# Patient Record
Sex: MALE | Race: WHITE | NOT HISPANIC OR LATINO | Employment: FULL TIME | URBAN - METROPOLITAN AREA
[De-identification: names, ages, dates, MRNs, and addresses within clinical notes are randomized per-mention and may not be internally consistent; named-entity substitution may affect disease eponyms.]

---

## 2019-11-18 NOTE — TELEPHONE ENCOUNTER
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 Moreno   DISCHARGE SUMMARY from hospital N/A    DISCHARGE REPORT from the ER N/A    OPERATIVE REPORT Care Everywhere 12/7/18   MEDICATION LIST Care Everywhere    IMPLANT RECORD/STICKER N/A    LABS     CBC/DIFF N/A    CULTURES N/A    INJECTIONS DONE IN RADIOLOGY N/A    MRI N/A    CT SCAN N/A    XRAYS (IMAGES & REPORTS) Received  [facility] 2018   TUMOR     PATHOLOGY  Slides & report N/A      11/18/19 SE  5:13 PM  Faxed request to [facility] for images

## 2019-12-12 NOTE — PROGRESS NOTES
Date of Service: 12/13/2019    Chief Complaint: No chief complaint on file.       HPI: Maxim is a 39 year old male who presents today for further evaluation of BL foot pain.  Nature: sharp    Location: hard to localize.     Duration: 1 year    Onset: gradual. No inciting trauma    Course: worsening    Aggravating/alleviating factors: walking and weight bearing aggravate. Rest alleviates.     Previous Treatments: Had BL neurectomies about a year ago. Pain never fully went away after the surgery. Has tried orthotics and no relief. Relates that he cannot stand for long periods. Worse barefoot.        Review of Systems: Answers for HPI/ROS submitted by the patient on 12/13/2019   General Symptoms: No  Skin Symptoms: No  HENT Symptoms: No  EYE SYMPTOMS: No  HEART SYMPTOMS: No  LUNG SYMPTOMS: No  INTESTINAL SYMPTOMS: Yes  URINARY SYMPTOMS: No  REPRODUCTIVE SYMPTOMS: No  SKELETAL SYMPTOMS: No  BLOOD SYMPTOMS: No  NERVOUS SYSTEM SYMPTOMS: No  MENTAL HEALTH SYMPTOMS: No  Heart burn or indigestion: Yes  Nausea: No  Vomiting: No  Abdominal pain: No  Bloating: No  Constipation: No  Diarrhea: No  Blood in stool: No  Black stools: No  Rectal or Anal pain: No  Fecal incontinence: No  Yellowing of skin or eyes: No  Vomit with blood: No  Change in stools: No      PMH: No past medical history on file.    PSxH: BL neurectomies.     Allergies: Patient has no allergy information on record.    SH:   Social History     Socioeconomic History     Marital status:      Spouse name: Not on file     Number of children: Not on file     Years of education: Not on file     Highest education level: Not on file   Occupational History     Not on file   Social Needs     Financial resource strain: Not on file     Food insecurity:     Worry: Not on file     Inability: Not on file     Transportation needs:     Medical: Not on file     Non-medical: Not on file   Tobacco Use     Smoking status: Not on file   Substance and Sexual Activity      Alcohol use: Not on file     Drug use: Not on file     Sexual activity: Not on file   Lifestyle     Physical activity:     Days per week: Not on file     Minutes per session: Not on file     Stress: Not on file   Relationships     Social connections:     Talks on phone: Not on file     Gets together: Not on file     Attends Christianity service: Not on file     Active member of club or organization: Not on file     Attends meetings of clubs or organizations: Not on file     Relationship status: Not on file     Intimate partner violence:     Fear of current or ex partner: Not on file     Emotionally abused: Not on file     Physically abused: Not on file     Forced sexual activity: Not on file   Other Topics Concern     Not on file   Social History Narrative     Not on file       FH: No family history on file.    Objective:      PT and DP pulses are 2/4 bilaterally. CRT is instant. Positive pedal hair.   Gross sensation is intact bilaterally. Some paresthesias noted to the BL 2nd and 3rd digits.   Equinus is noted bilaterally. Pain noted with palpation of the 2nd and 3rd interspaces BL. Pain with direct pressure on the interspaces. No pain with corresponding MTPJ ROM.   Nails normal bilaterally. No open lesions are noted.     bilateral foot xrays indicated in 6 weightbearing views.    No fractures. Close approximation of the 2nd and 3rd met heads. No soft tissue abnormality.      Assessment: Pain in the BL 2nd and 3rd interspaces. Ddx: stump neuroma, scar tissue.     Plan:  - Pt seen and evaluated.  - XRs taken and discussed with him.  - Recommend an MRI to assess the soft tissues.  - See again s/p MRI.

## 2019-12-13 ENCOUNTER — OFFICE VISIT (OUTPATIENT)
Dept: ORTHOPEDICS | Facility: CLINIC | Age: 39
End: 2019-12-13
Payer: COMMERCIAL

## 2019-12-13 ENCOUNTER — ANCILLARY PROCEDURE (OUTPATIENT)
Dept: GENERAL RADIOLOGY | Facility: CLINIC | Age: 39
End: 2019-12-13
Attending: PODIATRIST
Payer: COMMERCIAL

## 2019-12-13 ENCOUNTER — PRE VISIT (OUTPATIENT)
Dept: ORTHOPEDICS | Facility: CLINIC | Age: 39
End: 2019-12-13

## 2019-12-13 DIAGNOSIS — M79.672 PAIN IN BOTH FEET: ICD-10-CM

## 2019-12-13 DIAGNOSIS — M79.671 PAIN IN BOTH FEET: ICD-10-CM

## 2019-12-13 DIAGNOSIS — M79.671 PAIN IN BOTH FEET: Primary | ICD-10-CM

## 2019-12-13 DIAGNOSIS — M79.672 PAIN IN BOTH FEET: Primary | ICD-10-CM

## 2019-12-13 DIAGNOSIS — G57.62 MORTON'S NEUROMA OF LEFT FOOT: ICD-10-CM

## 2019-12-13 RX ORDER — METHOCARBAMOL 500 MG/1
500 TABLET, FILM COATED ORAL
COMMUNITY
Start: 2019-12-11 | End: 2019-12-18

## 2019-12-13 ASSESSMENT — ENCOUNTER SYMPTOMS
JAUNDICE: 0
RECTAL PAIN: 0
NAUSEA: 0
HEARTBURN: 1
DIARRHEA: 0
BLOOD IN STOOL: 0
VOMITING: 0
ABDOMINAL PAIN: 0
CONSTIPATION: 0
BLOATING: 0
BOWEL INCONTINENCE: 0

## 2019-12-13 NOTE — LETTER
RE: Maxim Torres  [address] [facility] [address]     Dear Colleague,    Thank you for referring your patient, Maxim Torres, to the [facility] at [facility]. Please see a copy of my visit note below.    Date of Service: 12/13/2019    Chief Complaint: No chief complaint on file.     HPI: Maxim is a 39 year old male who presents today for further evaluation of BL foot pain.  Nature: sharp    Location: hard to localize.     Duration: 1 year    Onset: gradual. No inciting trauma    Course: worsening    Aggravating/alleviating factors: walking and weight bearing aggravate. Rest alleviates.     Previous Treatments: Had BL neurectomies about a year ago. Pain never fully went away after the surgery. Has tried orthotics and no relief. Relates that he cannot stand for long periods. Worse barefoot.      PMH: No past medical history on file.    PSxH: BL neurectomies.     Allergies: Patient has no allergy information on record.    SH:   Social History     Socioeconomic History     Marital status:      Spouse name: Not on file     Number of children: Not on file     Years of education: Not on file     Highest education level: Not on file   Occupational History     Not on file   Social Needs     Financial resource strain: Not on file     Food insecurity:     Worry: Not on file     Inability: Not on file     Transportation needs:     Medical: Not on file     Non-medical: Not on file   Tobacco Use     Smoking status: Not on file   Substance and Sexual Activity     Alcohol use: Not on file     Drug use: Not on file     Sexual activity: Not on file   Lifestyle     Physical activity:     Days per week: Not on file     Minutes per session: Not on file     Stress: Not on file   Relationships     Social connections:     Talks on phone: Not on file     Gets together: Not on file     Attends Caodaism service: Not on file     Active member of club or  organization: Not on file     Attends meetings of clubs or organizations: Not on file     Relationship status: Not on file     Intimate partner violence:     Fear of current or ex partner: Not on file     Emotionally abused: Not on file     Physically abused: Not on file     Forced sexual activity: Not on file   Other Topics Concern     Not on file   Social History Narrative     Not on file       FH: No family history on file.    Objective:      PT and DP pulses are 2/4 bilaterally. CRT is instant. Positive pedal hair.   Gross sensation is intact bilaterally. Some paresthesias noted to the BL 2nd and 3rd digits.   Equinus is noted bilaterally. Pain noted with palpation of the 2nd and 3rd interspaces BL. Pain with direct pressure on the interspaces. No pain with corresponding MTPJ ROM.   Nails normal bilaterally. No open lesions are noted.     bilateral foot xrays indicated in 6 weightbearing views.    No fractures. Close approximation of the 2nd and 3rd met heads. No soft tissue abnormality.      Assessment: Pain in the BL 2nd and 3rd interspaces. Ddx: stump neuroma, scar tissue.     Plan:  - Pt seen and evaluated.  - XRs taken and discussed with him.  - Recommend an MRI to assess the soft tissues.  - See again s/p MRI.      Again, thank you for allowing me to participate in the care of your patient.      Sincerely,    Torito No DPM

## 2019-12-13 NOTE — NURSING NOTE
Reason For Visit:   Chief Complaint   Patient presents with     Consult     bilateral bermudez's neuroma L>R second opinion       There were no vitals taken for this visit.    Pain Assessment  Patient Currently in Pain: Yes  0-10 Pain Scale: 4  Primary Pain Location: Foot    Alondra Samson ATC

## 2019-12-23 ENCOUNTER — ANCILLARY PROCEDURE (OUTPATIENT)
Dept: MRI IMAGING | Facility: CLINIC | Age: 39
End: 2019-12-23
Attending: PODIATRIST
Payer: COMMERCIAL

## 2019-12-23 DIAGNOSIS — M79.672 PAIN IN BOTH FEET: ICD-10-CM

## 2019-12-23 DIAGNOSIS — M79.671 PAIN IN BOTH FEET: ICD-10-CM

## 2019-12-23 DIAGNOSIS — G57.62 MORTON'S NEUROMA OF LEFT FOOT: ICD-10-CM

## 2019-12-30 ENCOUNTER — OFFICE VISIT (OUTPATIENT)
Dept: ORTHOPEDICS | Facility: CLINIC | Age: 39
End: 2019-12-30
Payer: COMMERCIAL

## 2019-12-30 DIAGNOSIS — M79.672 PAIN IN BOTH FEET: Primary | ICD-10-CM

## 2019-12-30 DIAGNOSIS — M79.671 PAIN IN BOTH FEET: Primary | ICD-10-CM

## 2019-12-30 DIAGNOSIS — M77.52 BURSITIS OF LEFT FOOT: ICD-10-CM

## 2019-12-30 RX ADMIN — TESTOSTERONE CYPIONATE 1 ML: 200 INJECTION INTRAMUSCULAR at 15:13

## 2019-12-30 RX ADMIN — LIDOCAINE HYDROCHLORIDE 1 ML: 10 INJECTION, SOLUTION EPIDURAL; INFILTRATION; INTRACAUDAL; PERINEURAL at 15:13

## 2019-12-30 RX ADMIN — TRIAMCINOLONE ACETONIDE 20 MG: 40 INJECTION, SUSPENSION INTRA-ARTICULAR; INTRAMUSCULAR at 15:13

## 2019-12-30 NOTE — NURSING NOTE
Reason For Visit:   Chief Complaint   Patient presents with     RECHECK     FU after MRI        There were no vitals taken for this visit.    Pain Assessment  Patient Currently in Pain: Yes  0-10 Pain Scale: 5    Maricel Sanches, ATC

## 2019-12-30 NOTE — LETTER
2019       RE: Maxim Torres  [address] [facility] [address]     Dear Colleague,    Thank you for referring your patient, Maxim Torres, to the [facility] at [facility]. Please see a copy of my visit note below.    Small Joint Injection/Arthrocentesis  Date/Time: 2019 3:13 PM  Performed by: Torito No DPM  Authorized by: Torito No DPM   Indications:  Pain  Needle Size:  25 G  Guidance: landmark          Location comment:  Third Interspace of left foot                         Medications:  1 mL lidocaine (PF) 1 %; 20 mg triamcinolone 40 MG/ML; 1 mL dexamethasone 120 MG/30ML          Procedure discussed: discussed risks, benefits, and alternatives    Consent Given by:  Patient  Timeout: timeout called immediately prior to procedure    Prep: patient was prepped and draped in usual sterile fashion            [facility]  [address] [address]  [phone]  Dept: [phone]  ______________________________________________________________________________    Patient: Maxim Torres   : 1980   MRN: [id number]   2019    INVASIVE PROCEDURE SAFETY CHECKLIST    Date: 2019   Procedure:Left Foot corticosteroid injection into 3rd interspace  Patient Name: Maxim Torres  MRN: [id number]  YOB: 1980    Action: Complete sections as appropriate. Any discrepancy results in a HARD COPY until resolved.     PRE PROCEDURE:  Patient ID verified with 2 identifiers (name and  or MRN): Yes  Procedure and site verified with patient/designee (when able): Yes  Accurate consent documentation in medical record: Yes  H&P (or appropriate assessment) documented in medical record: Yes  H&P must be up to 20 days prior to procedure and updates within 24 hours of procedure as applicable: Yes  Relevant diagnostic and radiology test results  appropriately labeled and displayed as applicable: Yes  Procedure site(s) marked with provider initials: Yes    TIMEOUT:  Time-Out performed immediately prior to starting procedure, including verbal and active participation of all team members addressing the following:Yes  * Correct patient identify  * Confirmed that the correct side and site are marked  * An accurate procedure consent form  * Agreement on the procedure to be done  * Correct patient position  * Relevant images and results are properly labeled and appropriately displayed  * The need to administer antibiotics or fluids for irrigation purposes during the procedure as applicable   * Safety precautions based on patient history or medication use    DURING PROCEDURE: Verification of correct person, site, and procedures any time the responsibility for care of the patient is transferred to another member of the care team.       The following medications were given:         Prior to injection, verified patient identity using patient's name and date of birth.  Due to injection administration, patient instructed to remain in clinic for 15 minutes  afterwards, and to report any adverse reaction to me immediately.    Joint injection was performed.    Medication Name: Lidocaine 1%  Drug Amount Wasted:  Yes: 4 mg/ml   Vial/Syringe: Single dose vial  Expiration Date:  10/23    Joint injection was performed.    Medication Name: Kenalog 40   Drug Amount Wasted:  None.  Vial/Syringe: Single dose vial  Expiration Date:  9/21    Joint injection was performed.    Medication Name: Dexamethsone  Drug Amount Wasted:  None.  Vial/Syringe: Single dose vial  Expiration Date:  2/21      Maricel Sanches ATC    Chief Complaint:   Chief Complaint   Patient presents with     RECHECK     FU after MRI         No Known Allergies      Subjective: Maxim is a 39 year old male who presents to the clinic today for a follow up of left foot pain. He had the MRI performed.      Objective  PT and DP pulses are 2/4 bilaterally. CRT is instant. Positive pedal hair.   Gross sensation is intact bilaterally. Some paresthesias noted to the BL 2nd and 3rd digits.   Equinus is noted bilaterally. Pain noted with palpation of the 2nd and 3rd interspaces BL. Pain with direct pressure on the interspaces. No pain with corresponding MTPJ ROM.   Nails normal bilaterally. No open lesions are noted.     MRI Impression:     1. No MR evidence of interdigital neuroma on this noncontrast study.     2. Mild intermetatarsal bursitis of the first and third interspaces.     EDMOND ROSS MD (Joe)    Assessment: Left foot interspace bursitis with pain.    Plan:   - Pt seen and evaluated  - Recommend injection into the left 3rd interspace. He agrees to this.   - I discussed the risks, complications, alternatives, benefits, and answered all of his questions. Consent was signed. After skin prep, an injection consisting of 3cc's of a 1:1 mix of 1% lidocaine plain + Kenalog-40 + dexamethasone 4mg was injected into the left 3rd interspace. He tolerated this well with no complications.   - Pt to return to clinic in 1 month.         Again, thank you for allowing me to participate in the care of your patient.      Sincerely,    Torito No DPM

## 2019-12-30 NOTE — PROGRESS NOTES
Small Joint Injection/Arthrocentesis  Date/Time: 2019 3:13 PM  Performed by: Torito No DPM  Authorized by: Torito No DPM   Indications:  Pain  Needle Size:  25 G  Guidance: landmark          Location comment:  Third Interspace of left foot                         Medications:  1 mL lidocaine (PF) 1 %; 20 mg triamcinolone 40 MG/ML; 1 mL dexamethasone 120 MG/30ML          Procedure discussed: discussed risks, benefits, and alternatives    Consent Given by:  Patient  Timeout: timeout called immediately prior to procedure    Prep: patient was prepped and draped in usual sterile fashion            [facility]  [address] [facility] [address]  [phone]  Dept: [phone]  ______________________________________________________________________________    Patient: Maxim Torres   : 1980   MRN: [id number]   2019    INVASIVE PROCEDURE SAFETY CHECKLIST    Date: 2019   Procedure:Left Foot corticosteroid injection into 3rd interspace  Patient Name: Maxim Torres  MRN: [id number]  YOB: 1980    Action: Complete sections as appropriate. Any discrepancy results in a HARD COPY until resolved.     PRE PROCEDURE:  Patient ID verified with 2 identifiers (name and  or MRN): Yes  Procedure and site verified with patient/designee (when able): Yes  Accurate consent documentation in medical record: Yes  H&P (or appropriate assessment) documented in medical record: Yes  H&P must be up to 20 days prior to procedure and updates within 24 hours of procedure as applicable: Yes  Relevant diagnostic and radiology test results appropriately labeled and displayed as applicable: Yes  Procedure site(s) marked with provider initials: Yes    TIMEOUT:  Time-Out performed immediately prior to starting procedure, including verbal and active participation of all team members addressing the following:Yes  * Correct patient  identify  * Confirmed that the correct side and site are marked  * An accurate procedure consent form  * Agreement on the procedure to be done  * Correct patient position  * Relevant images and results are properly labeled and appropriately displayed  * The need to administer antibiotics or fluids for irrigation purposes during the procedure as applicable   * Safety precautions based on patient history or medication use    DURING PROCEDURE: Verification of correct person, site, and procedures any time the responsibility for care of the patient is transferred to another member of the care team.       The following medications were given:         Prior to injection, verified patient identity using patient's name and date of birth.  Due to injection administration, patient instructed to remain in clinic for 15 minutes  afterwards, and to report any adverse reaction to me immediately.    Joint injection was performed.    Medication Name: Lidocaine 1%  Drug Amount Wasted:  Yes: 4 mg/ml   Vial/Syringe: Single dose vial  Expiration Date:  10/23    Joint injection was performed.    Medication Name: Kenalog 40   Drug Amount Wasted:  None.  Vial/Syringe: Single dose vial  Expiration Date:  9/21    Joint injection was performed.    Medication Name: Dexamethsone  Drug Amount Wasted:  None.  Vial/Syringe: Single dose vial  Expiration Date:  2/21      Maricel Sanches, ATC    Chief Complaint:   Chief Complaint   Patient presents with     RECHECK     FU after MRI         No Known Allergies      Subjective: Maxim is a 39 year old male who presents to the clinic today for a follow up of left foot pain. He had the MRI performed.     Objective  PT and DP pulses are 2/4 bilaterally. CRT is instant. Positive pedal hair.   Gross sensation is intact bilaterally. Some paresthesias noted to the BL 2nd and 3rd digits.   Equinus is noted bilaterally. Pain noted with palpation of the 2nd and 3rd interspaces BL. Pain with direct pressure on  the interspaces. No pain with corresponding MTPJ ROM.   Nails normal bilaterally. No open lesions are noted.     MRI Impression:     1. No MR evidence of interdigital neuroma on this noncontrast study.     2. Mild intermetatarsal bursitis of the first and third interspaces.     EDMOND ROSS MD (Joe)    Assessment: Left foot interspace bursitis with pain.    Plan:   - Pt seen and evaluated  - Recommend injection into the left 3rd interspace. He agrees to this.   - I discussed the risks, complications, alternatives, benefits, and answered all of his questions. Consent was signed. After skin prep, an injection consisting of 3cc's of a 1:1 mix of 1% lidocaine plain + Kenalog-40 + dexamethasone 4mg was injected into the left 3rd interspace. He tolerated this well with no complications.   - Pt to return to clinic in 1 month.

## 2020-01-01 RX ORDER — TRIAMCINOLONE ACETONIDE 40 MG/ML
20 INJECTION, SUSPENSION INTRA-ARTICULAR; INTRAMUSCULAR
Status: SHIPPED | OUTPATIENT
Start: 2019-12-30

## 2020-01-01 RX ORDER — TESTOSTERONE CYPIONATE 200 MG/ML
1 INJECTION INTRAMUSCULAR
Status: SHIPPED | OUTPATIENT
Start: 2019-12-30

## 2020-01-01 RX ORDER — LIDOCAINE HYDROCHLORIDE 10 MG/ML
1 INJECTION, SOLUTION EPIDURAL; INFILTRATION; INTRACAUDAL; PERINEURAL
Status: DISCONTINUED | OUTPATIENT
Start: 2019-12-30 | End: 2020-07-21

## 2020-02-11 ENCOUNTER — OFFICE VISIT (OUTPATIENT)
Dept: ORTHOPEDICS | Facility: CLINIC | Age: 40
End: 2020-02-11
Payer: COMMERCIAL

## 2020-02-11 DIAGNOSIS — G57.62 MORTON'S NEUROMA OF LEFT FOOT: Primary | ICD-10-CM

## 2020-02-11 DIAGNOSIS — M79.672 PAIN IN BOTH FEET: ICD-10-CM

## 2020-02-11 DIAGNOSIS — M79.671 PAIN IN BOTH FEET: ICD-10-CM

## 2020-02-11 NOTE — PROGRESS NOTES
Chief Complaint:   Chief Complaint   Patient presents with     Follow Up     1 month post injection into the left 3rd interspace. Update handicap parking.         No Known Allergies      Subjective: Maxim is a 39 year old male who presents to the clinic today for a follow up of left 3rd interspace pain. Relates no change since the injection. Relates that he gets neuropathic symptoms in the foot. When he percusses the plantar foot, he does get sharp pain in the foot.    Objective  Data Unavailable Data Unavailable Data Unavailable Data Unavailable Data Unavailable 0 lbs 0 oz  PT and DP pulses are 2/4 bilaterally. CRT is instant. Positive pedal hair.   Gross sensation is intact bilaterally. Some paresthesias noted to the BL 2nd and 3rd digits.   Equinus is noted bilaterally. Pain noted with palpation of the 2nd and 3rd interspaces BL. Pain with direct pressure on the interspaces. No pain with corresponding MTPJ ROM.   Nails normal bilaterally. No open lesions are noted.     Assessment: Left foot pain s/p neurectomy. Although he does not have evidence of stump neuroma on images, he is likely having sequela from the sx. Injection therapy was not helpful.     Plan:   - Pt seen and evaluated  - Recommend a consult with pain management to explore options for better pain control. Possible sympathetic block? Order was entered.  - Pt to return to clinic PRN,.

## 2020-02-11 NOTE — NURSING NOTE
Reason For Visit:   Chief Complaint   Patient presents with     Follow Up     1 month post injection into the left 3rd interspace. Update handicap parking.        Pain Assessment  Patient Currently in Pain: Yes  0-10 Pain Scale: 6  Primary Pain Location: Foot(Left)        No Known Allergies        Meenu James LPN

## 2020-03-11 ENCOUNTER — HEALTH MAINTENANCE LETTER (OUTPATIENT)
Age: 40
End: 2020-03-11

## 2020-04-28 ENCOUNTER — VIRTUAL VISIT (OUTPATIENT)
Dept: ANESTHESIOLOGY | Facility: CLINIC | Age: 40
End: 2020-04-28
Attending: PODIATRIST
Payer: COMMERCIAL

## 2020-04-28 DIAGNOSIS — G57.93 NEUROPATHIC PAIN OF BOTH FEET: Primary | ICD-10-CM

## 2020-04-28 RX ORDER — GABAPENTIN 300 MG/1
900 CAPSULE ORAL 3 TIMES DAILY
Qty: 270 CAPSULE | Refills: 7 | Status: SHIPPED | OUTPATIENT
Start: 2020-04-28 | End: 2020-12-24

## 2020-04-28 RX ORDER — LIDOCAINE 40 MG/G
CREAM TOPICAL
Qty: 120 G | Refills: 1 | Status: SHIPPED | OUTPATIENT
Start: 2020-04-28 | End: 2020-07-21

## 2020-04-28 ASSESSMENT — ENCOUNTER SYMPTOMS: COUGH: 1

## 2020-04-28 ASSESSMENT — PAIN SCALES - GENERAL: PAINLEVEL: MODERATE PAIN (4)

## 2020-04-28 NOTE — PROGRESS NOTES
Pain Clinic New Patient Consult Note:    Referring Provider: Oneal (podiatry), for Pain s/p BL foot neurectomies   Primary care provider: Harpal New    Maxim Torres is a 39 year old y.o. old male who presents to the pain clinic with bilateral foot pain status post bilateral neurectomies for Tello's neuroma in 12/2018.    HPI:  Patient Supplied Answers To the  Pain Questionnaire    The pain is in his right and left (R=L) third interspace of his toes.  He had bilateral neurectomies 12/7/2018 and the pain changed in character immediately postoperatively. The pain is no longer present with squeezing the feet. Now he has a pounding sensation, like someone is hitting his foot with a hammer, whenever he taps on the dorsal aspect of his feet near his incisions.  The pain is diffuse, located on the whole front of foot.   The pain is not present at rest but occurs with prolonged standing and walking, but less so than standing. After he sits, the pain remains for quite some time, > 10 minutes.  He has no associated numbness or tingling or weakness.     Imaging (MRI from 12/2019-did not demonstrate a stump neuroma but podiatry thinks that his symptoms are secondary to the surgery.  He had a corticosteroid injection postoperatively (12/30/2019) with podiatry that was not beneficial.  He is also tried orthotics without benefit.  He cannot stand for long periods of time.  The pain is worse when he is barefoot.  Rubbing plantar foot on carpet is helpful    Current pain treatments:  Medications: None      Past pain treatments:  Maxim Torres has not been seen at a pain clinic in the past.    Herbal medicines: no  Physical therapy: no  Chiropractor: no  Pain physician: no  Surgery: as above  Biofeedback: no  Acupuncture: no  Interventions:   - 12/30/2019 Left Foot corticosteroid + LA injection into 3rd interspace, blind - not helpful, cannot recall immediate pain relief  - 6/5/2019 Left foot pain 6 mg injection  (ARLEN BOWMAN) [place] DRAIN/INJ SMALL JOINT/BURSA FINGERS TOES, blind, for capsulitis of L foot - not helpful, cannot recall immediate relief       Past Pain Medications:  Anti-convulsants:  Gabapentin 300 mg BID - not helpful  Muscle relaxors: no  Anti-depressants: no  NSAIDs: Ibuprofen 800 mg TID - not helpful  Acetaminophen: not helpful  Topicals:no  Headache abortives: no  Headache prophylactics: no  Opioids: no    Tests/Imaging reviewed with the patient:  MR left foot without  contrast 12/23/2019 2:50 PM  Impression:     1. No MR evidence of interdigital neuroma on this noncontrast study.     2. Mild intermetatarsal bursitis of the first and third interspaces.    Significant Medical History:   Brachial neuritis or radiculitis NOS  Cervical spondylosis  ADHD     Past Surgical History:  LASIK  ORIF arm fracture  Neurectomies as above     Family History:  Good Health Daughter Carrie     Arthritis Father       Thyroid Disease Mother       Good Health Sister       Good Health Sister       Good Health Son Charis           Social History:  Social History     Socioeconomic History     Marital status:      Spouse name: Not on file     Number of children: Not on file     Years of education: Not on file     Highest education level: Not on file   Occupational History     Not on file   Social Needs     Financial resource strain: Not on file     Food insecurity     Worry: Not on file     Inability: Not on file     Transportation needs     Medical: Not on file     Non-medical: Not on file   Tobacco Use     Smoking status: Not on file   Substance and Sexual Activity     Alcohol use: Not on file     Drug use: Not on file     Sexual activity: Not on file   Lifestyle     Physical activity     Days per week: Not on file     Minutes per session: Not on file     Stress: Not on file   Relationships     Social connections     Talks on phone: Not on file     Gets together: Not on file     Attends Hindu service: Not on file      Active member of club or organization: Not on file     Attends meetings of clubs or organizations: Not on file     Relationship status: Not on file     Intimate partner violence     Fear of current or ex partner: Not on file     Emotionally abused: Not on file     Physically abused: Not on file     Forced sexual activity: Not on file   Other Topics Concern     Not on file   Social History Narrative     Not on file     Social History     Social History Narrative     Not on file          Allergies:  No Known Allergies    Current Medications:   Current Outpatient Medications   Medication Sig Dispense Refill     omeprazole (PRILOSEC) 20 MG DR capsule Take 20 mg by mouth       Pediatric Multivitamins-Fl (MULTIVITAMIN WITH 1 MG FLUORIDE) 1 MG CHEW Take 1 tablet by mouth daily          Current Pain Medications:  Medications related to Pain Management (From now, onward)    None           [place] prescription Monitoring Program reviewed    Blood thinner: no    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    All other systems reviewed and are negative.  except as noted above in HPI      Laboratory results:  No results for input(s): NA, POTASSIUM, CHLORIDE, CO2, ANIONGAP, GLC, BUN, CR, INGRID in the last 50502 hours.    CBC RESULTS: No results for input(s): WBC, RBC, HGB, HCT, MCV, MCH, MCHC, RDW, PLT in the last 08587 hours.    Imaging:  MR left foot without  contrast 12/23/2019 2:50 PM                                                                   Impression:     1. No MR evidence of interdigital neuroma on this noncontrast study.     2. Mild intermetatarsal bursitis of the first and third interspaces.       ASSESSMENT AND PLAN:     Encounter Diagnosis:    Maxim Torres is a 39 year old y.o.  old male who presents to the pain clinic with chronic bilateral foot pain after bilateral Tello's neurectomy in December 2018.  He has a positive Tinel's at the site of his incisions bilaterally consistent with stump neuromas.  He has had injection, but never image guided.  We would like to get him in for ultrasound-guided injections to the digital nerves for visualization in the setting of postoperative altered anatomy.  We will also start a neuropathic agent, gabapentin with a goal of titrating to higher doses than he was at previously.  We will also start topical lidocaine.    I have summarized the patient history, discussed their clinical findings and differential diagnosis with the patient. I have discussed anatomy and possible sources of the pain using models and/or pictures(diagrams). I have discussed multi- disciplinary pain management options with the patient as pertaining to their case as detailed above. All questions and concerns were answered to the best of my ability.     Recommendations/Plan:   1. Patient education: I went over the above diagnoses and treatment plan with him and answered all of his questions.  2. Workup: Not at this time, image guided injections will serve both therapeutic and diagnostic purposes   Imaging Orders: None  3. Referrals: None at this time, patient is not interested in therapies  4. Medications:   - Start gabapentin, starting dose of 300 mg twice daily as he tolerated in the past.  We will titrate up to a goal of 900 mg 3 times daily   -start topical lidocaine  5. Interventions: Ultrasound-guided injections to the digital nerves at the 2 and 3 toe interspace bilaterally.  If he receives transient benefit with the local anesthetic and the injections but not long-lasting benefit we could consider radiofrequency ablation or injection with high concentration (4 or 5%) lidocaine for nerve ablation  6. Follow up: For procedure or 3 months, whichever is sooner    Thank you for  the consult.   Rosa Medeiros MD  Pain Medicine Fellow  [facility]    Duration of call 40 minutes    Patient discussed with Dr. Naqvi who agrees with the assessment and plan    I saw and examined the patient with the fellow and agree with the findings and the plan of care as documented in the fellow's note.   Wilmer Naqvi IV, MD

## 2020-04-28 NOTE — PATIENT INSTRUCTIONS
Medications:    Start gabapentin as follows:  1 tab= 300mg      AM   PM   Bedtime  300   300   0                  After 3-4 days, increase as tolerated to the next line    300 mg                  300 mg   300  After 3-4 days, increase as tolerated to the next line    300mg                  300mg                600 (2 tab).  After 3-4 days, increase as tolerated to the next line    600mg                  300mg                600 (2 tab).  After 3-4 days, increase as tolerated to the next line    600mg (2 tab)              600mg (2 tab)                     600 (2 tab).  After 3-4 days, increase as tolerated to the next line    600mg (2 tab)            600mg (2 tab)                     900 (3 tab).  After 3-4 days, increase as tolerated to the next line    900mg (3 tab)            600mg (2 tab)                     900 (3 tab).  After 3-4 days, increase as tolerated to the next line    900mg (3 tab)            900mg (9 tab)                     900 (3 tab). Continue as tolerated.     -If you find that you have adequate pain relief before getting to your goal dose of 900 mg Three times daily, you do not have to continue to increase dose.   -Call the clinic if you have questions or need clarification.     -Caution for sedation.   Do not drive until you know how the medication affects you.   You can go slower if you need to or increasing only one dose at a time.  Do not stop abruptly once at higher doses.  This medication must be tapered off.    Trial Topical Lidocaine ointment- apply to tops of feet as prescribed.     Procedures:    We will contact you when our procedure center re-opens, and assist you to schedule your recommended procedure.   (Bilateral Foot injection.)  -Please call our office if you have additional questions in the mean time.       Recommended Follow up:  For injections, or earlier if indicated.           To speak with a nurse, schedule/reschedule/cancel a clinic appointment, or request a medication  refill call: [phone]    You can also reach us by [email or web address]: [email or web address]    Please provide the clinic with a minium of 1 week notice, on all prescription refills.

## 2020-04-28 NOTE — PROGRESS NOTES
"Maxim Torres is a 39 year old male who is being evaluated via a billable video visit.      The patient has been notified of following:     \"This video visit will be conducted via a call between you and your physician/provider. We have found that certain health care needs can be provided without the need for an in-person physical exam.  This service lets us provide the care you need with a video conversation.  If a prescription is necessary we can send it directly to your pharmacy.  If lab work is needed we can place an order for that and you can then stop by our lab to have the test done at a later time.    Video visits are billed at different rates depending on your insurance coverage.  Please reach out to your insurance provider with any questions.    If during the course of the call the physician/provider feels a video visit is not appropriate, you will not be charged for this service.\"    Patient has given verbal consent for Video visit? Yes    How would you like to obtain your AVS? Paty    Patient would like the video invitation sent by: Text to cell phone: [phone]    Will anyone else be joining your video visit? No        Penny Holden CMA      "

## 2020-05-05 ENCOUNTER — TELEPHONE (OUTPATIENT)
Dept: ANESTHESIOLOGY | Facility: CLINIC | Age: 40
End: 2020-05-05

## 2020-05-05 NOTE — TELEPHONE ENCOUNTER
Prior Authorization Retail Medication Request    Medication/Dose: lidocaine (LMX4) 4 % external cream   ICD code (if different than what is on RX):   Previously Tried and Failed:    Rationale:      Insurance Name:   Insurance ID:        Pharmacy Information (if different than what is on RX)  Name:    Phone:

## 2020-05-05 NOTE — TELEPHONE ENCOUNTER
Central Prior Authorization Team   Phone: [phone]      PA Initiation    Medication: lidocaine (LMX4) 4 % external cream   Insurance Company: [employer] Part [employer] - Phone [phone] Fax [phone]  Pharmacy Filling the Rx: [facility] [address] [facility] [address]  Filling Pharmacy Phone: [phone]  Filling Pharmacy Fax:    Start Date: 5/5/2020

## 2020-05-06 NOTE — TELEPHONE ENCOUNTER
PRIOR AUTHORIZATION DENIED    Medication: lidocaine (LMX4) 4 % external cream- DENIED    Denial Date: 5/5/2020    Denial Rational:                   Appeal Information:

## 2020-06-23 DIAGNOSIS — D36.10 NEUROMA: Primary | ICD-10-CM

## 2020-07-07 ENCOUNTER — TELEPHONE (OUTPATIENT)
Dept: ANESTHESIOLOGY | Facility: CLINIC | Age: 40
End: 2020-07-07

## 2020-07-07 DIAGNOSIS — Z11.59 ENCOUNTER FOR SCREENING FOR OTHER VIRAL DISEASES: Primary | ICD-10-CM

## 2020-07-07 PROBLEM — D36.10 NEUROMA: Status: ACTIVE | Noted: 2020-07-07

## 2020-07-07 NOTE — TELEPHONE ENCOUNTER
Received voicemail from patient requesting a call back to reschedule injection with Dr. Naqvi. Patient states that 7/14 will no longer work and he is hoping to reschedule to 7/21/20. Please advise.

## 2020-07-17 ENCOUNTER — COMMUNICATION - HEALTHEAST (OUTPATIENT)
Dept: SCHEDULING | Facility: CLINIC | Age: 40
End: 2020-07-17

## 2020-07-17 ENCOUNTER — AMBULATORY - HEALTHEAST (OUTPATIENT)
Dept: INTERNAL MEDICINE | Facility: CLINIC | Age: 40
End: 2020-07-17

## 2020-07-17 DIAGNOSIS — Z11.59 ENCOUNTER FOR SCREENING FOR OTHER VIRAL DISEASES: ICD-10-CM

## 2020-07-18 ENCOUNTER — AMBULATORY - HEALTHEAST (OUTPATIENT)
Dept: FAMILY MEDICINE | Facility: CLINIC | Age: 40
End: 2020-07-18

## 2020-07-18 DIAGNOSIS — Z11.59 ENCOUNTER FOR SCREENING FOR OTHER VIRAL DISEASES: ICD-10-CM

## 2020-07-21 ENCOUNTER — HOSPITAL ENCOUNTER (OUTPATIENT)
Facility: AMBULATORY SURGERY CENTER | Age: 40
End: 2020-07-21
Payer: COMMERCIAL

## 2020-07-21 VITALS
HEIGHT: 68 IN | WEIGHT: 200 LBS | RESPIRATION RATE: 16 BRPM | BODY MASS INDEX: 30.31 KG/M2 | DIASTOLIC BLOOD PRESSURE: 52 MMHG | TEMPERATURE: 97.9 F | OXYGEN SATURATION: 96 % | HEART RATE: 89 BPM | SYSTOLIC BLOOD PRESSURE: 143 MMHG

## 2020-07-21 DIAGNOSIS — D36.10 NEUROMA: ICD-10-CM

## 2020-07-21 RX ORDER — BUPIVACAINE HYDROCHLORIDE 2.5 MG/ML
INJECTION, SOLUTION EPIDURAL; INFILTRATION; INTRACAUDAL PRN
Status: DISCONTINUED | OUTPATIENT
Start: 2020-07-21 | End: 2020-07-21 | Stop reason: HOSPADM

## 2020-07-21 RX ORDER — METHYLPREDNISOLONE ACETATE 40 MG/ML
INJECTION, SUSPENSION INTRA-ARTICULAR; INTRALESIONAL; INTRAMUSCULAR; SOFT TISSUE PRN
Status: DISCONTINUED | OUTPATIENT
Start: 2020-07-21 | End: 2020-07-21 | Stop reason: HOSPADM

## 2020-07-21 ASSESSMENT — MIFFLIN-ST. JEOR: SCORE: 1781.69

## 2020-07-21 NOTE — DISCHARGE INSTRUCTIONS
Home Care Instructions after a Lower Extremity nerve block    In a lower extremity nerve block, local anesthetic or  numbing  medication is injected around the nerves to anesthetize or  numb  the leg or foot. When used for chronic pain, it is hoped that this procedure will interrupt the nerve pathways and provide long term pain relief. If your lower extremity is numb, you should be careful since it is possible to injure yourself without being aware of it. If your leg or foot is numb, you should:    Avoid striking or bumping your leg/foot    Avoid extreme hot or cold    Activity  -You may resume most normal activity levels with the exception of strenuous activity. It is important for us to know if your pain with normal activity is relieved after this injection.  -DO NOT shower for 24 hours  -DO NOT remove bandaid for 24 hours    Pain  -You may experience soreness at the injection site for one or two days  -You may use an ice pack for 20 minutes every 2 hours for the first 24 hours  -You may use a heating pad after the first 24 hours  -You may use Tylenol (acetaminophen) every 4 hours or other pain medicines as     directed by your physician      DID YOU RECEIVE SEDATION TODAY?  No      DID YOU RECEIVE STEROIDS TODAY?  Yes    Common side effects of steroids:  Not everyone will experience corticosteroid side effects. If side effects are experienced, they will gradually subside in the 7-10 day period following an injection. Most common side effects include:  -Flushed face and/or chest  -Feeling of warmth, particularly in the face but could be an overall feeling of warmth  -Increased blood sugar in diabetic patients  -Sleep disturbances and/or mood swings are possible  -Leg cramps      PLEASE KEEP TRACK OF YOUR SYMPTOMS AND NOTE YOUR IMPROVEMENT FOR YOUR DOCTOR.     Please contact us if you have:  -Severe pain  -Fever more than 101.5 degrees Fahrenheit  -Signs of infection at the injection site (redness, swelling, or  drainage)    If you have questions, please contact our office at [phone] between the hours of 7:00 am and 3:00 pm Monday through Friday. After office hours you can contact the on call provider by dialing [phone]. If you need immediate attention, we recommend that you go to a hospital emergency room or dial [phone].

## 2020-07-22 NOTE — H&P
"Maxim Torres  [id number]  male  40 year old      Reason for procedure/surgery: Bilateral Foot Neuromae    Patient Active Problem List   Diagnosis     Neuroma       Past Surgical History:    Past Surgical History:   Procedure Laterality Date     NERVE BLOCK PERIPHERAL Bilateral 7/21/2020    Procedure: Bilateral Neuroma Injection (Feet);  Surgeon: Wilmer Naqvi MD;  Location: [facility] OR       Past Medical History: History reviewed. No pertinent past medical history.    Social History:   Social History     Tobacco Use     Smoking status: Former Smoker     Packs/day: 1.00     Years: 5.00     Pack years: 5.00     Types: Cigarettes     Last attempt to quit: 7/21/2005     Years since quitting: 15.0     Smokeless tobacco: Never Used   Substance Use Topics     Alcohol use: Yes     Comment: Occassional       Family History: History reviewed. No pertinent family history.    Allergies: No Known Allergies    Active Medications:   Current Outpatient Medications   Medication Sig Dispense Refill     gabapentin (NEURONTIN) 300 MG capsule Take 3 capsules (900 mg) by mouth 3 times daily Titrate up starting at 300 mg twice daily to goal dose of 900 mg three times daily. Titrate as described in AVS as clinically indicated. If patient receives sufficient benefit at doses lower than 900mg TID, ok to stop at lower dose. 270 capsule 7     omeprazole (PRILOSEC) 20 MG DR capsule Take 20 mg by mouth       Pediatric Multivitamins-Fl (MULTIVITAMIN WITH 1 MG FLUORIDE) 1 MG CHEW Take 1 tablet by mouth daily         Systemic Review:   CONSTITUTIONAL: NEGATIVE for fever, chills, change in weight  ENT/MOUTH: NEGATIVE for ear, mouth and throat problems  RESP: NEGATIVE for significant cough or SOB  CV: NEGATIVE for chest pain, palpitations or peripheral edema    Physical Examination:   Vital Signs: BP (!) 143/52   Pulse 89   Temp 97.9  F (36.6  C) (Temporal)   Resp 16   Ht 1.727 m (5' 8\")   Wt 90.7 kg (200 lb)   SpO2 96%   BMI 30.41 kg/m  "   GENERAL: healthy, alert and no distress  NECK: no adenopathy, no asymmetry, masses, or scars  RESP: lungs clear to auscultation - no rales, rhonchi or wheezes  CV: regular rate and rhythm, normal S1 S2, no S3 or S4, no murmur, click or rub, no peripheral edema and peripheral pulses strong  ABDOMEN: soft, nontender, no hepatosplenomegaly, no masses and bowel sounds normal  MS: no gross musculoskeletal defects noted, no edema    Plan: Appropriate to proceed as scheduled.      Wilmer Naqvi MD  7/22/2020

## 2020-07-23 ENCOUNTER — COMMUNICATION - HEALTHEAST (OUTPATIENT)
Dept: FAMILY MEDICINE | Facility: CLINIC | Age: 40
End: 2020-07-23

## 2020-08-04 NOTE — PROCEDURES
"Neuroma Injection    The patient s identity, the procedure to be performed and the specific site of the procedure was verified in accordance with [facility] Protocol.   Diagnosis:  Foot Pain  Pre-Block Pain Score: 5/10      Procedure Note:    Informed consent was obtained.    There was no evidence of infection at the site of needle insertion. The skin was prepped with Chloraprep  and then anesthetized with 1% lidocaine.  Using ultrasound guidance a 1 1/4\" needle was inserted into area around the metatarsals bilaterally.  The needle was advanced near a neuroma and the patient reported a parasthesia. Then a total of 20mg of kenalog and 1.5ml of 0.25% bupivicaine was injected.    The patient tolerated the procedure without complications.  The patient was observed for 10 minutes and was then released with post-procedure instructions.     The patient was given discharge instructions and verbalizes understanding, including understanding of those signs and symptoms that would require emergency care.     BLOCK: Bilateral metatarsal neuroma  Needle Type:   27g      Local Anesthetic: 1.5ml of 0.25% bupivicaine  Post-Procedure Pain Score: 2/10  Counseling: Greater than 50% of this patient visit was spent in counseling the patient regarding the treatment of their pain, coordinating their overall treatment plan and assessing their progress.        "

## 2021-01-04 ENCOUNTER — HEALTH MAINTENANCE LETTER (OUTPATIENT)
Age: 41
End: 2021-01-04

## 2021-04-25 ENCOUNTER — HEALTH MAINTENANCE LETTER (OUTPATIENT)
Age: 41
End: 2021-04-25

## 2021-05-28 ENCOUNTER — RECORDS - HEALTHEAST (OUTPATIENT)
Dept: ADMINISTRATIVE | Facility: CLINIC | Age: 41
End: 2021-05-28

## 2021-05-28 ENCOUNTER — TELEPHONE (OUTPATIENT)
Dept: ANESTHESIOLOGY | Facility: CLINIC | Age: 41
End: 2021-05-28

## 2021-05-28 NOTE — TELEPHONE ENCOUNTER
RN returned call to patient to address Gabapentin refill request that was received. LM for patient to return call to clinic to discuss this.    Shira Simon RN

## 2021-05-28 NOTE — TELEPHONE ENCOUNTER
[facility] Call Center    Phone Message    May a detailed message be left on voicemail: yes     Reason for Call: Medication Refill Request    Has the patient contacted the pharmacy for the refill? Yes   Name of medication being requested: gabapentin (NEURONTIN) 300 MG capsule  Provider who prescribed the medication: Donya  Pharmacy: [facility] [address] [facility] [address] [facility] [address]  Date medication is needed: asap      Patient states that pharmacy has sent 4 requests this week for a refill. Patient states he took his last 2 pills today and is now out of the medication. Thank you!       Action Taken: Message routed to:  [facility] ([facility] pain    Travel Screening: Not Applicable

## 2021-05-28 NOTE — TELEPHONE ENCOUNTER
Patient returned call to clinic. Requesting a refill on Gabapentin. Writer discussed that patient hasn't been evaluated in almost a year and will need to make a clinic visit in order to consider refilling medication. Patient verbalized understanding and reports he will follow up with clinic in future if needed.    Shira Simon RN

## 2021-06-09 NOTE — TELEPHONE ENCOUNTER
Had a message to call to pre-register for his PCR covid testing on 7/18/2020. They would like verbal consent.    Pt is fine and gives consent for the covid testing on 7/18/2020.    Pt advised to proceed to his appointment tomorrow.    Palma Berry, RN   [facility] Medication Refill and Triage Nurse  3:02 PM  7/17/2020        Additional Information    Question about upcoming scheduled test, no triage required and triager able to answer question    Protocols used: INFORMATION ONLY CALL-A-AH

## 2021-06-09 NOTE — PROGRESS NOTES
Coronavirus (COVID-19) Notification    Your result for COVID-19 is Negative  Letter sent that will serve as a formal notice for your employer [employer]

## 2021-06-20 NOTE — LETTER
Letter by Severson, Tammie F, LPN at      Author: Severson, Tammie F, LPN Service: -- Author Type: --    Filed:  Encounter Date: 7/23/2020 Status: (Other)       7/23/2020        Maxim Torres  [address]  [place] [address]    This letter provides a written record that you were tested for COVID-19 on 7/18/20.     Your result was negative. This means that we didnt find the virus that causes COVID-19 in your sample. A test may show negative when you do actually have the virus. This can happen when the virus is in the early stages of infection, before you feel illness symptoms.    If you have symptoms   Stay home and away from others (self-isolate) until you meet ALL of the guidelines below:    Youve had no fever--and no medicine that reduces fever--for 3 full days (72 hours). And ?    Your other symptoms have gotten better. For example, your cough or breathing has improved. And?    At least 10 days have passed since your symptoms started.    During this time:    Stay home. Dont go to work, school or anywhere else.     Stay in your own room, including for meals. Use your own bathroom if you can.    Stay away from others in your home. No hugging, kissing or shaking hands. No visitors.    Clean high touch surfaces often (doorknobs, counters, handles, etc.). Use a household cleaning spray or wipes. You can find a full list on the EPA website at www.epa.gov/pesticide-registration/list-n-disinfectants-use-against-sars-cov-2.    Cover your mouth and nose with a mask, tissue or washcloth to avoid spreading germs.    Wash your hands and face often with soap and water.    Going back to work  Check with your employer for any guidelines to follow for going back to work.    Employers: This document serves as formal notice that your employee tested negative for COVID-19, as of the testing date shown above.

## 2021-10-10 ENCOUNTER — HEALTH MAINTENANCE LETTER (OUTPATIENT)
Age: 41
End: 2021-10-10

## 2022-05-22 ENCOUNTER — HEALTH MAINTENANCE LETTER (OUTPATIENT)
Age: 42
End: 2022-05-22

## 2022-09-18 ENCOUNTER — HEALTH MAINTENANCE LETTER (OUTPATIENT)
Age: 42
End: 2022-09-18

## 2023-06-04 ENCOUNTER — HEALTH MAINTENANCE LETTER (OUTPATIENT)
Age: 43
End: 2023-06-04

## 2024-08-19 NOTE — LETTER
2/11/2020       RE: Maxim Torres  [address] [facility] [address]     Dear Colleague,    Thank you for referring your patient, Maxim Torres, to the [facility] at [facility]. Please see a copy of my visit note below.    Chief Complaint:   Chief Complaint   Patient presents with     Follow Up     1 month post injection into the left 3rd interspace. Update handicap parking.         No Known Allergies      Subjective: Maxim is a 39 year old male who presents to the clinic today for a follow up of left 3rd interspace pain. Relates no change since the injection. Relates that he gets neuropathic symptoms in the foot. When he percusses the plantar foot, he does get sharp pain in the foot.    Objective  Data Unavailable Data Unavailable Data Unavailable Data Unavailable Data Unavailable 0 lbs 0 oz  PT and DP pulses are 2/4 bilaterally. CRT is instant. Positive pedal hair.   Gross sensation is intact bilaterally. Some paresthesias noted to the BL 2nd and 3rd digits.   Equinus is noted bilaterally. Pain noted with palpation of the 2nd and 3rd interspaces BL. Pain with direct pressure on the interspaces. No pain with corresponding MTPJ ROM.   Nails normal bilaterally. No open lesions are noted.     Assessment: Left foot pain s/p neurectomy. Although he does not have evidence of stump neuroma on images, he is likely having sequela from the sx. Injection therapy was not helpful.     Plan:   - Pt seen and evaluated  - Recommend a consult with pain management to explore options for better pain control. Possible sympathetic block? Order was entered.  - Pt to return to clinic PRN,.       Again, thank you for allowing me to participate in the care of your patient.      Sincerely,    Torito No DPM       [phone]

## 2025-03-06 PROCEDURE — 88304 TISSUE EXAM BY PATHOLOGIST: CPT | Mod: TC,ORL | Performed by: ORTHOPAEDIC SURGERY

## 2025-03-07 ENCOUNTER — LAB REQUISITION (OUTPATIENT)
Dept: LAB | Facility: CLINIC | Age: 45
End: 2025-03-07
Payer: COMMERCIAL

## 2025-03-07 DIAGNOSIS — M79.89 OTHER SPECIFIED SOFT TISSUE DISORDERS: ICD-10-CM

## 2025-03-11 LAB
PATH REPORT.COMMENTS IMP SPEC: NORMAL
PATH REPORT.FINAL DX SPEC: NORMAL
PATH REPORT.GROSS SPEC: NORMAL
PATH REPORT.MICROSCOPIC SPEC OTHER STN: NORMAL
PATH REPORT.RELEVANT HX SPEC: NORMAL
PHOTO IMAGE: NORMAL

## 2025-03-11 PROCEDURE — 88304 TISSUE EXAM BY PATHOLOGIST: CPT | Mod: 26 | Performed by: PATHOLOGY

## (undated) DEVICE — GLOVE PROTEXIS POWDER FREE SMT 8.0  2D72PT80X

## (undated) DEVICE — NDL SPINAL 22GA 3.5" QUINCKE 405181

## (undated) DEVICE — TRAY PAIN INJECTION 97A 640

## (undated) DEVICE — PREP CHLORAPREP W/ORANGE TINT 10.5ML 260715

## (undated) RX ORDER — TRIAMCINOLONE ACETONIDE 40 MG/ML
INJECTION, SUSPENSION INTRA-ARTICULAR; INTRAMUSCULAR
Status: DISPENSED
Start: 2019-12-30

## (undated) RX ORDER — DEXAMETHASONE SODIUM PHOSPHATE 4 MG/ML
INJECTION, SOLUTION INTRA-ARTICULAR; INTRALESIONAL; INTRAMUSCULAR; INTRAVENOUS; SOFT TISSUE
Status: DISPENSED
Start: 2019-12-30

## (undated) RX ORDER — LIDOCAINE HYDROCHLORIDE 10 MG/ML
INJECTION, SOLUTION EPIDURAL; INFILTRATION; INTRACAUDAL; PERINEURAL
Status: DISPENSED
Start: 2019-12-30